# Patient Record
(demographics unavailable — no encounter records)

---

## 2025-03-05 NOTE — HISTORY OF PRESENT ILLNESS
[FreeTextEntry1] : TIFF is a 7 year old F who presents for evaluation of R ankle injury sustained on 2/14/25.  She was at [facility] where she landed in between the trampolines on the padded area onto her right ankle.  It is unsure if she rolled her ankle or what happened at that time.  She initially had pain, but was able to continue weightbearing.  The next day she noticed significant swelling of her right ankle and [facility] were taken and no obvious fracture was noted.  She was referred to see [facility] where they placed her into a cam boot.  Mom reports that Tiff continues to try to weight-bear without the boot. She was then referred from [facility] to our practice.  She is here for orthopaedic evaluation.

## 2025-03-05 NOTE — PHYSICAL EXAM
[FreeTextEntry1] : R ankle: Skin intact No ecchymosis or bruising No soft tissue swelling No TTP over ATFL/CFL/deltoid ligament No TTP over prox fibula Does not endorse to tenderness to palpation over the distal fibula however this is only place where Juniper does not respond to questioning  No pain with compression of the syndesmosis No TTP over medial malleolus or anterior tibia Negative anterior drawer with the foot plantarflexed and dorsiflexed Negative increased inversion > 15 compared to the contralateral side

## 2025-03-05 NOTE — ASSESSMENT
[FreeTextEntry1] : KARLENE is a 7 year old F with a R ankle injury sustained on 2/14/25.  Today's visit included obtaining the history from the child and parent, due to the child's age, the child could not be considered a reliable historian, requiring the parent to act as an independent historian. The condition, natural history, and prognosis were explained to the patient and family. The clinical findings and images were reviewed with the family.   Repeat x-rays taken in office today are negative for any signs of interval healing of a potential occult fracture.  Clinically she likely sustained a bone contusion.  She has minimal pain and is able to weight-bear as tolerated without difficulty.  Recommendation at this time is to gradually return to all activities as tolerated.  All questions were answered, the family expresses understanding and agrees with the plan of care.   This note was generated using Dragon medical dictation software. A reasonable effort has been made for proofreading its contents, but typos may still remain. If there are any questions or points of clarification needed please do not hesitate to contact my office.

## 2025-04-18 NOTE — PROCEDURE
[226 Hz] : 226 Hz [Normal Eardrum Mobility] : consistent with restricted eardrum mobility [Type C Tympanogram] : Type C Retracted [] : Audiogram: [Play Audiometry] : Play Audiometry [Good] : good [Headphones] : headphones [FreeTextEntry2] : Present TEOAEs from 2kHz-4kHz in the left ear. Present TEOAEs from 3kHz-4kHz in the right ear. OAE results may be impacted by type C tymps, AU.  [de-identified] : Hearing within normal limits from 500Hz-8kHz, bilaterally. SRT in agreement with tonal findings. Excellent SRS obtained, bilaterally.

## 2025-04-18 NOTE — ASSESSMENT
[FreeTextEntry1] : Reviewed results and recommendations with patient's mother who expressed her understanding of all. Explained the auditory processing evaluation including the expected length of evaluation and types of tasks to be expected. Provided mother with contact information to send required documents for review.

## 2025-04-18 NOTE — PROCEDURE
[226 Hz] : 226 Hz [Normal Eardrum Mobility] : consistent with restricted eardrum mobility [Type C Tympanogram] : Type C Retracted [] : Audiogram: [Play Audiometry] : Play Audiometry [Good] : good [Headphones] : headphones [FreeTextEntry2] : Present TEOAEs from 2kHz-4kHz in the left ear. Present TEOAEs from 3kHz-4kHz in the right ear. OAE results may be impacted by type C tymps, AU.  [de-identified] : Hearing within normal limits from 500Hz-8kHz, bilaterally. SRT in agreement with tonal findings. Excellent SRS obtained, bilaterally.

## 2025-04-18 NOTE — HISTORY OF PRESENT ILLNESS
[FreeTextEntry1] : Patient is a 7-year-old female seen today for an audiological evaluation prior to pursuing an auditory processing evaluation. Patient has diagnoses of autism and ADHD. She receives OT, PT and speech therapy at home 1x/week. Patient is currently home schooled. As per mother, NBHS was passed at birth. History of ear infections and a family history of childhood hearing loss were denied.  [FreeTextEntry8] : Patient's mother reported that patient woke with a cold this morning.

## 2025-04-18 NOTE — PLAN
[FreeTextEntry2] : 1. Audiological re-evaluation as medically indicated and/or if change in hearing noted. 2. Continued educational support services as indicated. 3. Pursue auditory processing evaluation. Will contact patient's mother to schedule, if patient is found to be a candidate for testing.